# Patient Record
Sex: FEMALE | Race: WHITE
[De-identification: names, ages, dates, MRNs, and addresses within clinical notes are randomized per-mention and may not be internally consistent; named-entity substitution may affect disease eponyms.]

---

## 2022-04-22 ENCOUNTER — HOSPITAL ENCOUNTER (EMERGENCY)
Dept: HOSPITAL 102 - ANHED | Age: 72
Discharge: HOME | End: 2022-04-22
Payer: MEDICARE

## 2022-04-22 VITALS
RESPIRATION RATE: 16 BRPM | OXYGEN SATURATION: 99 % | DIASTOLIC BLOOD PRESSURE: 80 MMHG | TEMPERATURE: 97.7 F | SYSTOLIC BLOOD PRESSURE: 146 MMHG | HEART RATE: 94 BPM

## 2022-04-22 VITALS
DIASTOLIC BLOOD PRESSURE: 71 MMHG | SYSTOLIC BLOOD PRESSURE: 141 MMHG | HEART RATE: 96 BPM | RESPIRATION RATE: 18 BRPM | OXYGEN SATURATION: 98 %

## 2022-04-22 DIAGNOSIS — K59.00: Primary | ICD-10-CM

## 2022-04-22 DIAGNOSIS — Z87.891: ICD-10-CM

## 2022-04-22 LAB
ADD MANUAL DIFF: NO
ALANINE AMINOTRANSFERASE: 73 U/L (ref 4–35)
ALBUMIN LEVEL: 4.3 G/DL (ref 3.5–5.1)
ALBUMIN SPEC-MCNC: 4.3 G/DL (ref 3.5–5.1)
ALKALINE PHOSPHATASE: 97 U/L (ref 38–126)
ALP SERPL-CCNC: 97 U/L (ref 38–126)
ALT SERPL-CCNC: 73 U/L (ref 4–35)
ANION GAP: 5 MMOL/L (ref 8–16)
ASPARTATE AMINO TRANSFERASE: 44 U/L (ref 14–36)
AST SERPL-CCNC: 44 U/L (ref 14–36)
BASIC METABOLIC AND HEMATOCRIT PNL BLD: 44.5 % (ref 37–47)
BILIRUBIN,TOTAL: 0.1 MG/DL (ref 0.2–1.3)
BLOOD UREA NITROGEN: 17 MG/DL (ref 7–17)
BUN SERPL-MCNC: 17 MG/DL (ref 7–17)
CALCIUM: 9.4 MG/DL (ref 8.4–10.2)
CARBON DIOXIDE: 30 MMOL/L (ref 22–30)
CHLORIDE SPEC-MCNC: 102 MMOL/L (ref 98–107)
CHLORIDE: 102 MMOL/L (ref 98–107)
CREAT CL PREDICTED SERPL C-G-VRATE: 55 ML/MIN
ESTIMATED CRCL CALCULATION: 55 ML/MIN
ESTIMATED GLOMERULAR FILT RATE: > 60
GFRSERPLBLD MDRD-ARVRAT: > 60 ML/MIN/{1.73_M2}
GLUCOSE SERPL-MCNC: 193 MG/DL (ref 65–110)
GLUCOSE: 193 MG/DL (ref 65–110)
HEMATOCRIT: 44.5 % (ref 37–47)
HEMOGLOBIN: 14.1 G/DL (ref 12–15)
HGB+HCT PNL BLD: 44.5 % (ref 37–47)
IMM GRANULOCYTES NFR BLD AUTO: 0.3 % (ref 0–0.5)
IPF CHARGE: (no result)
LYMPHOCYTES # SPEC AUTO: 1.56 K/MM3 (ref 0.9–3.2)
MCV RBC: 94.9 FL (ref 80–100)
MEAN CORPUSCULAR HEMOGLOBIN: 30.1 PG (ref 26–34)
MEAN CORPUSCULAR HGB CONC: 31.7 G/DL (ref 32–36)
MICRO URNS: YES
NUCLEATED RED BLOOD CELLS ABSOLUTE AUTO: 0 K/MM3 (ref 0–0.01)
NUCLEATED RED BLOOD CELLS PERC: 0 % (ref 0–0.2)
PH UR: 6 [PH] (ref 5–9)
PLATELET COUNT RESULT: 209 K/MM3 (ref 150–375)
POTASSIUM: 4.1 MMOL/L (ref 3.4–5)
PROT SERPL-MCNC: 7 G/DL (ref 6.3–8.2)
RED BLOOD COUNT: 4.69 M/MM3 (ref 4.2–5.4)
SODIUM: 137 MMOL/L (ref 137–145)
SP GR UR: 1.02 (ref 1–1.03)
TOTAL PROTEIN: 7 G/DL (ref 6.3–8.2)
WHITE BLOOD COUNT: 11.9 K/MM3 (ref 4.5–10)

## 2022-04-22 PROCEDURE — 36415 COLL VENOUS BLD VENIPUNCTURE: CPT

## 2022-04-22 PROCEDURE — 81001 URINALYSIS AUTO W/SCOPE: CPT

## 2022-04-22 PROCEDURE — 99284 EMERGENCY DEPT VISIT MOD MDM: CPT

## 2022-04-22 PROCEDURE — 74176 CT ABD & PELVIS W/O CONTRAST: CPT

## 2022-04-22 PROCEDURE — 80053 COMPREHEN METABOLIC PANEL: CPT

## 2022-04-22 PROCEDURE — 85025 COMPLETE CBC W/AUTO DIFF WBC: CPT

## 2022-05-16 ENCOUNTER — HOSPITAL ENCOUNTER (OUTPATIENT)
Age: 72
Discharge: HOME | End: 2022-05-16
Payer: MEDICARE

## 2022-05-16 DIAGNOSIS — R53.1: ICD-10-CM

## 2022-05-16 DIAGNOSIS — M19.011: Primary | ICD-10-CM

## 2022-05-16 DIAGNOSIS — G89.29: ICD-10-CM

## 2022-05-16 PROCEDURE — 73030 X-RAY EXAM OF SHOULDER: CPT

## 2022-08-10 ENCOUNTER — HOSPITAL ENCOUNTER (OUTPATIENT)
Age: 72
Discharge: HOME | End: 2022-08-10
Payer: MEDICARE

## 2022-08-10 DIAGNOSIS — Z12.31: Primary | ICD-10-CM

## 2022-08-10 DIAGNOSIS — M85.89: ICD-10-CM

## 2022-08-10 DIAGNOSIS — Z78.0: ICD-10-CM

## 2022-08-10 PROCEDURE — 77067 SCR MAMMO BI INCL CAD: CPT

## 2022-08-10 PROCEDURE — 77063 BREAST TOMOSYNTHESIS BI: CPT

## 2022-08-10 PROCEDURE — 77080 DXA BONE DENSITY AXIAL: CPT

## 2023-02-01 ENCOUNTER — HOSPITAL ENCOUNTER (OUTPATIENT)
Dept: HOSPITAL 102 - ANHIMG | Age: 73
Discharge: HOME | End: 2023-02-01
Payer: MEDICARE

## 2023-02-01 DIAGNOSIS — R05.9: Primary | ICD-10-CM

## 2023-02-01 DIAGNOSIS — J98.4: ICD-10-CM

## 2023-02-01 PROCEDURE — 71046 X-RAY EXAM CHEST 2 VIEWS: CPT

## 2023-06-26 ENCOUNTER — HOSPITAL ENCOUNTER (EMERGENCY)
Age: 73
Discharge: HOME | End: 2023-06-26
Payer: MEDICARE

## 2023-06-26 VITALS
SYSTOLIC BLOOD PRESSURE: 122 MMHG | TEMPERATURE: 97.5 F | OXYGEN SATURATION: 98 % | DIASTOLIC BLOOD PRESSURE: 71 MMHG | RESPIRATION RATE: 18 BRPM | HEART RATE: 89 BPM

## 2023-06-26 DIAGNOSIS — S52.501A: Primary | ICD-10-CM

## 2023-06-26 DIAGNOSIS — Z87.891: ICD-10-CM

## 2023-06-26 DIAGNOSIS — E78.5: ICD-10-CM

## 2023-06-26 DIAGNOSIS — E11.9: ICD-10-CM

## 2023-06-26 DIAGNOSIS — S93.401A: ICD-10-CM
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
## 2023-06-26 PROCEDURE — 73130 X-RAY EXAM OF HAND: CPT

## 2023-06-26 PROCEDURE — A9270 NON-COVERED ITEM OR SERVICE: HCPCS

## 2023-06-26 PROCEDURE — 73100 X-RAY EXAM OF WRIST: CPT

## 2023-06-26 PROCEDURE — 99284 EMERGENCY DEPT VISIT MOD MDM: CPT

## 2023-06-26 PROCEDURE — 29125 APPL SHORT ARM SPLINT STATIC: CPT

## 2023-06-26 PROCEDURE — 73610 X-RAY EXAM OF ANKLE: CPT

## 2023-06-26 PROCEDURE — 73630 X-RAY EXAM OF FOOT: CPT

## 2023-10-03 ENCOUNTER — HOSPITAL ENCOUNTER (OUTPATIENT)
Age: 73
Discharge: HOME | End: 2023-10-03
Payer: MEDICARE

## 2023-10-03 VITALS — BODY MASS INDEX: 20.6 KG/M2

## 2023-10-03 VITALS
DIASTOLIC BLOOD PRESSURE: 49 MMHG | OXYGEN SATURATION: 97 % | HEART RATE: 83 BPM | SYSTOLIC BLOOD PRESSURE: 94 MMHG | RESPIRATION RATE: 16 BRPM

## 2023-10-03 VITALS
RESPIRATION RATE: 16 BRPM | DIASTOLIC BLOOD PRESSURE: 67 MMHG | HEART RATE: 80 BPM | OXYGEN SATURATION: 99 % | SYSTOLIC BLOOD PRESSURE: 127 MMHG | TEMPERATURE: 97.2 F

## 2023-10-03 VITALS
OXYGEN SATURATION: 96 % | HEART RATE: 83 BPM | SYSTOLIC BLOOD PRESSURE: 95 MMHG | DIASTOLIC BLOOD PRESSURE: 47 MMHG | RESPIRATION RATE: 23 BRPM

## 2023-10-03 VITALS
OXYGEN SATURATION: 100 % | HEART RATE: 84 BPM | RESPIRATION RATE: 20 BRPM | DIASTOLIC BLOOD PRESSURE: 69 MMHG | SYSTOLIC BLOOD PRESSURE: 113 MMHG

## 2023-10-03 DIAGNOSIS — Z86.010: ICD-10-CM

## 2023-10-03 DIAGNOSIS — E11.9: ICD-10-CM

## 2023-10-03 DIAGNOSIS — Z12.11: Primary | ICD-10-CM

## 2023-10-03 DIAGNOSIS — Z79.84: ICD-10-CM

## 2023-10-03 DIAGNOSIS — Z87.891: ICD-10-CM

## 2023-10-03 DIAGNOSIS — E78.5: ICD-10-CM

## 2023-10-03 DIAGNOSIS — K64.8: ICD-10-CM

## 2023-10-03 PROCEDURE — 0DJD8ZZ INSPECTION OF LOWER INTESTINAL TRACT, VIA NATURAL OR ARTIFICIAL OPENING ENDOSCOPIC: ICD-10-PCS | Performed by: INTERNAL MEDICINE

## 2023-10-03 PROCEDURE — 82948 REAGENT STRIP/BLOOD GLUCOSE: CPT

## 2023-11-13 ENCOUNTER — HOSPITAL ENCOUNTER
Dept: HOSPITAL 102 - ANHLAB | Age: 73
Discharge: HOME | End: 2023-11-13
Payer: MEDICARE

## 2023-11-13 DIAGNOSIS — L82.1: Primary | ICD-10-CM

## 2023-11-13 PROCEDURE — 88305 TISSUE EXAM BY PATHOLOGIST: CPT

## 2023-11-13 PROCEDURE — 88342 IMHCHEM/IMCYTCHM 1ST ANTB: CPT

## 2023-12-28 ENCOUNTER — APPOINTMENT (RX ONLY)
Dept: URBAN - METROPOLITAN AREA CLINIC 142 | Facility: CLINIC | Age: 73
Setting detail: DERMATOLOGY
End: 2023-12-28

## 2023-12-28 DIAGNOSIS — L23.9 ALLERGIC CONTACT DERMATITIS, UNSPECIFIED CAUSE: ICD-10-CM

## 2023-12-28 DIAGNOSIS — L71.8 OTHER ROSACEA: ICD-10-CM

## 2023-12-28 DIAGNOSIS — L57.0 ACTINIC KERATOSIS: ICD-10-CM

## 2023-12-28 PROBLEM — L30.9 DERMATITIS, UNSPECIFIED: Status: ACTIVE | Noted: 2023-12-28

## 2023-12-28 PROCEDURE — ? PRESCRIPTION MEDICATION MANAGEMENT

## 2023-12-28 PROCEDURE — ? PRESCRIPTION

## 2023-12-28 PROCEDURE — ? LIQUID NITROGEN

## 2023-12-28 PROCEDURE — ? COUNSELING

## 2023-12-28 PROCEDURE — 17000 DESTRUCT PREMALG LESION: CPT

## 2023-12-28 PROCEDURE — 99203 OFFICE O/P NEW LOW 30 MIN: CPT | Mod: 25

## 2023-12-28 RX ORDER — METRONIDAZOLE 7.5 MG/G
GEL TOPICAL
Qty: 45 | Refills: 12 | Status: CANCELLED | COMMUNITY
Start: 2023-12-28

## 2023-12-28 RX ORDER — TRIAMCINOLONE ACETONIDE 0.25 MG/G
CREAM TOPICAL
Qty: 80 | Refills: 3 | Status: CANCELLED | COMMUNITY
Start: 2023-12-28

## 2023-12-28 RX ORDER — TRIAMCINOLONE ACETONIDE 0.25 MG/G
CREAM TOPICAL
Qty: 80 | Refills: 3 | Status: ERX

## 2023-12-28 RX ORDER — METRONIDAZOLE 7.5 MG/G
GEL TOPICAL
Qty: 45 | Refills: 12 | Status: ERX

## 2023-12-28 RX ADMIN — TRIAMCINOLONE ACETONIDE: 0.25 CREAM TOPICAL at 00:00

## 2023-12-28 RX ADMIN — METRONIDAZOLE: 7.5 GEL TOPICAL at 00:00

## 2023-12-28 ASSESSMENT — LOCATION DETAILED DESCRIPTION DERM
LOCATION DETAILED: LEFT INFERIOR CENTRAL MALAR CHEEK
LOCATION DETAILED: LEFT MEDIAL MALAR CHEEK
LOCATION DETAILED: RIGHT UPPER CUTANEOUS LIP

## 2023-12-28 ASSESSMENT — LOCATION ZONE DERM
LOCATION ZONE: FACE
LOCATION ZONE: LIP

## 2023-12-28 ASSESSMENT — LOCATION SIMPLE DESCRIPTION DERM
LOCATION SIMPLE: LEFT CHEEK
LOCATION SIMPLE: RIGHT LIP

## 2024-02-29 ENCOUNTER — APPOINTMENT (RX ONLY)
Dept: URBAN - METROPOLITAN AREA CLINIC 142 | Facility: CLINIC | Age: 74
Setting detail: DERMATOLOGY
End: 2024-02-29

## 2024-02-29 DIAGNOSIS — Z41.9 ENCOUNTER FOR PROCEDURE FOR PURPOSES OTHER THAN REMEDYING HEALTH STATE, UNSPECIFIED: ICD-10-CM

## 2024-02-29 DIAGNOSIS — L71.8 OTHER ROSACEA: ICD-10-CM

## 2024-02-29 PROCEDURE — ? COUNSELING

## 2024-02-29 PROCEDURE — ? COSMETIC CONSULTATION: IPL

## 2024-02-29 PROCEDURE — ? ADDITIONAL NOTES

## 2024-02-29 PROCEDURE — 99213 OFFICE O/P EST LOW 20 MIN: CPT

## 2024-02-29 PROCEDURE — ? PRESCRIPTION MEDICATION MANAGEMENT

## 2024-02-29 ASSESSMENT — LOCATION DETAILED DESCRIPTION DERM: LOCATION DETAILED: LEFT INFERIOR CENTRAL MALAR CHEEK

## 2024-02-29 ASSESSMENT — LOCATION SIMPLE DESCRIPTION DERM: LOCATION SIMPLE: LEFT CHEEK

## 2024-02-29 ASSESSMENT — LOCATION ZONE DERM: LOCATION ZONE: FACE

## 2024-02-29 NOTE — PROCEDURE: ADDITIONAL NOTES
Render Risk Assessment In Note?: no
Additional Notes: Physician advised Patient to stop tanning before scheduling IPL
Detail Level: Simple

## 2024-02-29 NOTE — PROCEDURE: PRESCRIPTION MEDICATION MANAGEMENT
Detail Level: Zone
Continue Regimen: Metronidazole \\nTriamcinolone
Render In Strict Bullet Format?: No

## 2024-04-22 ENCOUNTER — HOSPITAL ENCOUNTER (OUTPATIENT)
Dept: HOSPITAL 102 - ANHIMG | Age: 74
Discharge: HOME | End: 2024-04-22
Payer: MEDICARE

## 2024-04-22 DIAGNOSIS — Z12.31: Primary | ICD-10-CM

## 2024-04-22 PROCEDURE — 77067 SCR MAMMO BI INCL CAD: CPT

## 2024-04-22 PROCEDURE — 77063 BREAST TOMOSYNTHESIS BI: CPT

## 2024-09-02 ENCOUNTER — HOSPITAL ENCOUNTER (EMERGENCY)
Age: 74
Discharge: HOME | End: 2024-09-02
Payer: MEDICARE

## 2024-09-02 VITALS
SYSTOLIC BLOOD PRESSURE: 183 MMHG | HEART RATE: 83 BPM | DIASTOLIC BLOOD PRESSURE: 82 MMHG | RESPIRATION RATE: 18 BRPM | OXYGEN SATURATION: 100 % | TEMPERATURE: 97.5 F

## 2024-09-02 VITALS — SYSTOLIC BLOOD PRESSURE: 175 MMHG | OXYGEN SATURATION: 97 % | DIASTOLIC BLOOD PRESSURE: 91 MMHG

## 2024-09-02 VITALS — OXYGEN SATURATION: 99 %
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
## 2024-09-02 DIAGNOSIS — S82.031A: Primary | ICD-10-CM

## 2024-09-02 DIAGNOSIS — E11.9: ICD-10-CM

## 2024-09-02 PROCEDURE — 99284 EMERGENCY DEPT VISIT MOD MDM: CPT

## 2024-09-02 PROCEDURE — A9270 NON-COVERED ITEM OR SERVICE: HCPCS

## 2024-09-02 PROCEDURE — 73564 X-RAY EXAM KNEE 4 OR MORE: CPT

## 2024-09-02 NOTE — XR_ITS
EXAMINATION: XR knee RT min 4V 
 
DATE: 09/02/2024 16:49 
 
INDICATION: Fall with right knee injury 
 
TECHNIQUE: Anteroposterior, 2 oblique, sunrise and crosstable lateral views of the right knee were ob
tained 
 
COMPARISON: None. 
 
FINDINGS:  
There is distraction of a transverse intra-articular fracture extending across the central aspect of 
the patella. There is 7 mm widening of the superficial margin of the fracture plane. 1-2 mm separatio
n and smaller degree of step-off along the articular surface at the deep margin of the fracture. No o
ther fractures identified. Tricompartmental osteoarthritis at the right knee with small marginal oste
ophytes and at least mild joint space narrowing the medial and lateral compartments although this cou
ld be underestimated on nonweightbearing imaging. There is a moderate-sized layering lipohemarthrosis
 at the suprapatellar pouch. There are few loose osteochondral bodies at the posterior recess of the 
knee. 
 
IMPRESSION: 
1. Mild distraction of an intra-articular fracture extending horizontally across the patella with mod
erate-sized lipohemarthrosis. 
2. At least mild tricompartmental osteoarthritis at the right knee. 
 
__________________________________________________ 
Reviewed, dictated and finalized at location A. 
Electronically signed by Shamir Meek M.D. on 9/2/2024 16:53 CDT 
IMPRESSION:  
1. Mild distraction of an intra-articular fracture extending horizontally acros
s the patella with moderate-sized lipohemarthrosis.  
2. At least mild tricompartmental osteoarthritis at the right knee.

## 2024-09-02 NOTE — ED.LOWEXIN
HPI - Extremity Injury (Lower)
General
Chief Complaint: 
      Extremity Injury, Lower <Rusty Dow PA-C - Last Filed: 09/02/24 16:14>
Stated Complaint: 
      tripped on concrete, R knee pain <Rusty Dow PA-C - Last Filed: 09/02/24 16:14>
Time Seen by Provider: 
      09/02/24 16:05 <Rusty Dow PA-C - Last Filed: 09/02/24 16:14>
        Focused HPI:  This is a 74-year-old female who presents to the ED with chief complaint of right knee injury that occurred just prior to arrival.  Patient states that she tripped and fell directly onto the concrete.  Reports a lot of pain and 
swelling to the right knee but no further sites of pain or injury.  She took ibuprofen prior to arrival.  Denies numbness, weakness.

GENERAL: Well-appearing, well-nourished, and in no acute distress.
HEAD: Normocephalic, atraumatic.
CHEST: Clear to auscultation.  No respiratory distress.
HEART: Regular rate and rhythm.
  MSK:  Right knee swelling noted.  Tenderness throughout the right knee.  Able to slightly actively range the knee. 
NEURO:  Alert and oriented x3.

Patient screened in triage and initial orders placed.   Additional care and disposition to be based upon diagnostic testing and treatment.
 <Rusty Dow PA-C - Last Filed: 09/02/24 16:14>
        Focused HPI:  This is a 74-year-old female who presents to the ED with chief complaint of right knee injury that occurred just prior to arrival.  Patient states that she tripped and fell directly onto the concrete.  Reports a lot of pain and 
swelling to the right knee but no further sites of pain or injury.  She took ibuprofen prior to arrival.  Denies numbness, weakness.

GENERAL: Well-appearing, well-nourished, and in no acute distress.
HEAD: Normocephalic, atraumatic.
CHEST: Clear to auscultation.  No respiratory distress.
HEART: Regular rate and rhythm.
MSK:  Right knee swelling noted.  Tenderness throughout the right knee.  Able to slightly actively range the knee. 
NEURO:  Alert and oriented x3.

Patient screened in triage and initial orders placed.   Additional care and disposition to be based upon diagnostic testing and treatment.
 <Cady Berrios PA-C - Last Filed: 09/02/24 18:27>
Source: 
      patient <Rusty Dow PA-C - Last Filed: 09/02/24 16:14>
Mode of arrival: 
      wheelchair <Rusty Dow PA-C - Last Filed: 09/02/24 16:14>
Limitations: 
      no limitations <Rusty Dow PA-C - Last Filed: 09/02/24 16:14>
Related Data
Home Medications: 
       Home Medications

 Medication  Instructions  Recorded  Confirmed
glucosamine-chondroitin 500 mg-400 1 tablet PO DAILY 05/10/22 10/19/23
mg tablet   
calcium carbonate 600 mg-vitamin 1 cap PO BID 08/16/22 10/19/23
D3 25 mcg (1,000 unit) capsule   
vit 1 cap PO DAILY 08/29/23 10/19/23
C,E,zinc,Cu-omega-3-lutein-zeaxanthin   
250 mg-2.5 mg-0.5 mg capsule   

 <Rusty Dow PA-C - Last Filed: 09/02/24 16:14>
Allergies/Adverse Reactions: 
       Allergies

Allergy/AdvReac Type Severity Reaction Status Date / Time
No Known Allergies Allergy   Verified 10/19/23 14:19

 <Rusty Dow PA-C - Last Filed: 09/02/24 16:14>

Review of Systems
Review of Systems:   
CONSTITUTIONAL: Denies fever
MUSCULOSKELETAL: Reports joint pain, and myalgia.
NEUROLOGIC: Denies numbness, or weakness.

    <Cady Berrios PA-C - Last Filed: 09/02/24 18:27>
All systems reviewed & are unremarkable except as noted in HPI and below   <Cady Berrios PA-C - Last Filed: 09/02/24 18:27>

[facility]
Past Medical History
Medical History: 
      Medical History (Updated 09/02/24 @ 18:24 by Cady Berrios PA-C)

Adult BMI 19-24 kg/sq m
BMI 20.0-20.9, adult
BMI 21.0-21.9, adult
Cholesterol polyp of gallbladder
Chronic sinusitis
Constipation
Cough
DM type 2 (diabetes mellitus, type 2)
PRATHER (dyspnea on exertion)
Dyslipidemia
Elevated glucose
Elevated LFTs
Encounter for Medicare annual wellness exam
Encounter for routine adult health examination with abnormal findings
Encoun

## 2024-09-02 NOTE — ED_ITS
HPI - Extremity Injury (Lower)    
General    
Chief Complaint:     
      Extremity Injury, Lower <Rusty Dow PA-C - Last Filed: 09/02/24 16:14>    
Stated Complaint:     
      tripped on concrete, R knee pain <Rusty Dow PA-C - Last Filed:   
09/02/24 16:14>    
Time Seen by Provider:     
      09/02/24 16:05 <Rusty Dow PA-C - Last Filed: 09/02/24 16:14>    
        Focused HPI:  This is a 74-year-old female who presents to the ED with   
chief complaint of right knee injury that occurred just prior to arrival.    
Patient states that she tripped and fell directly onto the concrete.  Reports a   
lot of pain and swelling to the right knee but no further sites of pain or   
injury.  She took ibuprofen prior to arrival.  Denies numbness, weakness.    
    
GENERAL: Well-appearing, well-nourished, and in no acute distress.    
HEAD: Normocephalic, atraumatic.    
CHEST: Clear to auscultation.  No respiratory distress.    
HEART: Regular rate and rhythm.    
  MSK:  Right knee swelling noted.  Tenderness throughout the right knee.  Able   
to slightly actively range the knee.     
NEURO:  Alert and oriented x3.    
    
Patient screened in triage and initial orders placed.   Additional care and di  
sposition to be based upon diagnostic testing and treatment.    
 <Rusty Dow PA-C - Last Filed: 09/02/24 16:14>    
        Focused HPI:  This is a 74-year-old female who presents to the ED with   
chief complaint of right knee injury that occurred just prior to arrival.    
Patient states that she tripped and fell directly onto the concrete.  Reports a   
lot of pain and swelling to the right knee but no further sites of pain or   
injury.  She took ibuprofen prior to arrival.  Denies numbness, weakness.    
    
GENERAL: Well-appearing, well-nourished, and in no acute distress.    
HEAD: Normocephalic, atraumatic.    
CHEST: Clear to auscultation.  No respiratory distress.    
HEART: Regular rate and rhythm.    
MSK:  Right knee swelling noted.  Tenderness throughout the right knee.  Able to  
slightly actively range the knee.     
NEURO:  Alert and oriented x3.    
    
Patient screened in triage and initial orders placed.   Additional care and   
disposition to be based upon diagnostic testing and treatment.    
 <Cady Berrios PA-C - Last Filed: 09/02/24 18:27>    
Source:     
      patient <Rusty Dow PA-C - Last Filed: 09/02/24 16:14>    
Mode of arrival:     
      wheelchair <Rusty Dow PA-C - Last Filed: 09/02/24 16:14>    
Limitations:     
      no limitations <Rusty Dow PA-C - Last Filed: 09/02/24 16:14>    
Related Data    
Home Medications:     
                                Home Medications    
    
    
    
 Medication  Instructions  Recorded  Confirmed    
     
glucosamine-chondroitin 500 mg-400 1 tablet PO DAILY 05/10/22 10/19/23    
    
mg tablet       
     
calcium carbonate 600 mg-vitamin 1 cap PO BID 08/16/22 10/19/23    
    
D3 25 mcg (1,000 unit) capsule       
     
vit 1 cap PO DAILY 08/29/23 10/19/23    
    
C,E,zinc,Cu-omega-3-lutein-zeaxanthin       
    
250 mg-2.5 mg-0.5 mg capsule       
    
    
 <Rusty Dow PA-C - Last Filed: 09/02/24 16:14>    
Allergies/Adverse Reactions:     
                                    Allergies    
    
    
    
Allergy/AdvReac Type Severity Reaction Status Date / Time    
     
No Known Allergies Allergy   Verified 10/19/23 14:19    
    
    
 <Rusty Dow PA-C - Last Filed: 09/02/24 16:14>    
    
Review of Systems    
    
    
Review of Systems:       
CONSTITUTIONAL: Denies fever    
MUSCULOSKELETAL: Reports joint pain, and myalgia.    
NEUROLOGIC: Denies numbness, or weakness.    
    
      <Cady Berrios PA-C - Last Filed: 09/02/24 18:27>    
All systems reviewed & are unremarkable except as noted in HPI and below       
<Cady Berrios PA-C - Last Filed: 09/02/24 18:27>    
    
    
[facility]    
Past Medi

## 2025-06-16 ENCOUNTER — HOSPITAL ENCOUNTER (OUTPATIENT)
Age: 75
Discharge: HOME | End: 2025-06-16
Payer: MEDICARE

## 2025-06-16 DIAGNOSIS — Z12.31: Primary | ICD-10-CM

## 2025-06-16 DIAGNOSIS — R92.8: ICD-10-CM

## 2025-06-16 PROCEDURE — 77067 SCR MAMMO BI INCL CAD: CPT

## 2025-06-16 PROCEDURE — 77063 BREAST TOMOSYNTHESIS BI: CPT
